# Patient Record
Sex: FEMALE | Race: WHITE | Employment: UNEMPLOYED | URBAN - METROPOLITAN AREA
[De-identification: names, ages, dates, MRNs, and addresses within clinical notes are randomized per-mention and may not be internally consistent; named-entity substitution may affect disease eponyms.]

---

## 2018-07-25 ENCOUNTER — OFFICE VISIT (OUTPATIENT)
Dept: FAMILY MEDICINE CLINIC | Age: 73
End: 2018-07-25

## 2018-07-25 ENCOUNTER — TELEPHONE (OUTPATIENT)
Dept: FAMILY MEDICINE CLINIC | Age: 73
End: 2018-07-25

## 2018-07-25 VITALS
HEART RATE: 73 BPM | OXYGEN SATURATION: 96 % | TEMPERATURE: 96 F | RESPIRATION RATE: 16 BRPM | BODY MASS INDEX: 29.99 KG/M2 | HEIGHT: 57 IN | DIASTOLIC BLOOD PRESSURE: 64 MMHG | WEIGHT: 139 LBS | SYSTOLIC BLOOD PRESSURE: 100 MMHG

## 2018-07-25 DIAGNOSIS — M65.331 TRIGGER FINGER, RIGHT MIDDLE FINGER: Primary | ICD-10-CM

## 2018-07-25 DIAGNOSIS — I48.20 CHRONIC ATRIAL FIBRILLATION (HCC): ICD-10-CM

## 2018-07-25 DIAGNOSIS — E11.9 CONTROLLED TYPE 2 DIABETES MELLITUS WITHOUT COMPLICATION, WITHOUT LONG-TERM CURRENT USE OF INSULIN (HCC): Primary | ICD-10-CM

## 2018-07-25 RX ORDER — ATENOLOL 100 MG/1
50 TABLET ORAL DAILY
COMMUNITY
Start: 2018-05-09

## 2018-07-25 RX ORDER — METFORMIN HYDROCHLORIDE 1000 MG/1
TABLET ORAL
COMMUNITY
Start: 2018-05-04

## 2018-07-25 RX ORDER — OLIVE LEAF EXTRACT 250 MG
CAPSULE ORAL
COMMUNITY
End: 2019-12-04

## 2018-07-25 NOTE — MR AVS SNAPSHOT
[address] [place] [address] [address] [id number] 
[phone] Patient: Nicole Fields MRN: [id number] UDX:[id number] Visit Information Date & Time Provider Department Dept. Phone Encounter #  
 7/25/2018 11:30 AM Sarahi Dos Santos MD [facility] [place] [phone] [id number] Follow-up Instructions Return in about 4 months (around 11/25/2018). Follow-up and Disposition History Upcoming Health Maintenance Date Due Hepatitis C Screening 1945 DTaP/Tdap/Td series (1 - Tdap) 2/14/1967 FOBT Q 1 YEAR AGE 50-75 2/14/1996 ZOSTER VACCINE AGE 60> 12/14/2005 GLAUCOMA SCREENING Q2Y 2/14/2011 Pneumococcal 65+ Low/Medium Risk (1 of 2 - PCV13) 2/14/2011 BREAST CANCER SCRN MAMMOGRAM 1/28/2017 MEDICARE YEARLY EXAM 3/20/2018 Influenza Age 5 to Adult 8/1/2018 Allergies as of 7/25/2018  Review Complete On: 7/25/2018 By: Sarahi Dos Santos MD  
  
 Severity Noted Reaction Type Reactions Alendronate High   Swelling In the feet Current Immunizations  Never Reviewed No immunizations on file. Not reviewed this visit You Were Diagnosed With   
  
 Codes Comments Controlled type 2 diabetes mellitus without complication, without long-term current use of insulin ([facility]    -  Primary ICD-10-CM: E11.9 ICD-9-CM: 250.00 Chronic atrial fibrillation (HCC)     ICD-10-CM: F25.2 ICD-9-CM: 427.31 Vitals BP Pulse Temp Resp Height(growth percentile) Weight(growth percentile) 100/64 (BP 1 Location: Left arm, BP Patient Position: Sitting) 73 96 °F (35.6 °C) (Oral) 16 4' 9\" (1.448 m) 139 lb (63 kg) SpO2 BMI OB Status Smoking Status 96% 30.08 kg/m2 Menopause Never Smoker Vitals History BMI and BSA Data Body Mass Index Body Surface Area 30.08 kg/m 2 1.59 m 2 Preferred Pharmacy Pharmacy Name Phone [address] [place] [address] [phone] Your Updated Medication List  
  
   
This list is accurate as of 7/25/18 12:14 PM.  Always use your most recent med list.  
  
  
  
  
 apixaban 5 mg tablet Commonly known as:  ELIQUIS  
5 mg. ASCORBIC ACID (VITAMIN C) PO Take  by Mouth. atenolol 100 mg tablet Commonly known as:  TENORMIN  
100 mg. CHOLECALCIFEROL (VITAMIN D3) PO  
1,000 mg. CINNAMON BARK PO Take  by Mouth. CYANOCOBALAMIN (VITAMIN B-12) PO Take  by Mouth. GYMNEMA LEAF (BULK)  
by Does Not Apply route. metFORMIN 1,000 mg tablet Commonly known as:  GLUCOPHAGE  
TAKE 1 TABLET TWICE DAILY  
  
 olive leaf extract 250 mg Cap Take  by mouth. vit a,c & e-lutein-minerals tablet Commonly known as:  OCUVITE  
daily. Follow-up Instructions Return in about 4 months (around 11/25/2018). Introducing [facility]! Florecita Conley introduces [employer] patient portal. Now you can access parts of your medical record, email your doctor's office, and request medication refills online. 1. In your internet browser, go to [email or web address] "[email or web address] 2. Click on the First Time User? Click Here link in the Sign In box. You will see the New Member Sign Up page. 3. Enter your [employer] Access Code exactly as it appears below. You will not need to use this code after youve completed the sign-up process. If you do not sign up before the expiration date, you must request a new code. · [employer] Access Code: [email or web address] Expires: 10/23/2018 12:14 PM 
 
4. Enter the last four digits of your Social Security Number (xxxx) and Date of Birth (mm/dd/yyyy) as indicated and click Submit. You will be taken to the next sign-up page. 5. Create a ZhenXin ID. This will be your [employer] login ID and cannot be changed, so think of one that is secure and easy to remember. 6. Create a [employer] password. You can change your password at any time. 7. Enter your Password Reset Question and Answer. This can be used at a later time if you forget your password. 8. Enter your e-mail address. You will receive e-mail notification when new information is available in [address] 9. Click Sign Up. You can now view and download portions of your medical record. 10. Click the Download Summary menu link to download a portable copy of your medical information. If you have questions, please visit the Frequently Asked Questions section of the [employer] website. Remember, [employer] is NOT to be used for urgent needs. For medical emergencies, dial 911. Now available from your iPhone and Android! Please provide this summary of care documentation to your next provider. Your primary care clinician is listed as Wilson Jameson. If you have any questions after today's visit, please call [phone].

## 2018-07-25 NOTE — PROGRESS NOTES
HISTORY OF PRESENT ILLNESS  Nury Givens is a 68 y.o. female here to establish care. Patient has history of diabetes who maintains her A1c at 6.1. She also has a history of atrial fibrillation for which she uses Eliquis. Overall patient is feeling well and has no complaints. Diabetes   The history is provided by the patient. This is a chronic problem. The problem has not changed since onset. Pertinent negatives include no chest pain, no abdominal pain, no headaches and no shortness of breath. The symptoms are aggravated by eating. The symptoms are relieved by medications. Treatments tried: Metformin. The treatment provided significant relief. Irregular Heart Beat    The history is provided by the patient. This is a chronic problem. The problem has not changed since onset. The problem is associated with nothing. Associated symptoms include irregular heartbeat. Pertinent negatives include no diaphoresis, no malaise/fatigue, no chest pain, no chest pressure, no exertional chest pressure, no orthopnea, no abdominal pain, no headaches and no shortness of breath. Risk factors include diabetes mellitus and postmenopause. She has tried prescription medication (Eliquis and atenolol) for the symptoms. The treatment provided significant relief. Her past medical history is significant for atrial fibrillation. Allergies   Allergen Reactions    Alendronate Swelling     In the feet     No current outpatient prescriptions on file prior to visit. No current facility-administered medications on file prior to visit.       Past Medical History:   Diagnosis Date    [facility] ([facility]     Diabetes ([facility]     Melanoma ([facility]     back of the head, removed    Osteoporosis     Periodic heart flutter      Past Surgical History:   Procedure Laterality Date    HX APPENDECTOMY  1966    HX OTHER SURGICAL  1963    Coccygectomy    HX TONSILLECTOMY  1965    HX TONSILLECTOMY      HX TUBAL LIGATION  1975     Family History   Problem Relation Age of Onset    Cancer Mother     Cancer Father      Social History     Social History    Marital status: UNKNOWN     Spouse name: N/A    Number of children: N/A    Years of education: N/A     Occupational History    Not on file. Social History Main Topics    Smoking status: Never Smoker    Smokeless tobacco: Never Used    Alcohol use No    Drug use: No    Sexual activity: Not Currently     Other Topics Concern    Not on file     Social History Narrative       Review of Systems   Constitutional: Negative. Negative for diaphoresis and malaise/fatigue. Eyes: Negative. Respiratory: Negative. Negative for shortness of breath. Cardiovascular: Positive for palpitations. Negative for chest pain and orthopnea. Gastrointestinal: Negative for abdominal pain. Musculoskeletal: Negative. Neurological: Negative. Negative for headaches. Endo/Heme/Allergies: Negative. Psychiatric/Behavioral: Negative. .  Visit Vitals    /64 (BP 1 Location: Left arm, BP Patient Position: Sitting)    Pulse 73    Temp 96 °F (35.6 °C) (Oral)    Resp 16    Ht 4' 9\" (1.448 m)    Wt 139 lb (63 kg)    SpO2 96%    BMI 30.08 kg/m2       Physical Exam   Constitutional: She is oriented to person, place, and time. She appears well-developed and well-nourished. HENT:   Head: Normocephalic and atraumatic. Cardiovascular: Normal rate, regular rhythm, normal heart sounds and intact distal pulses. Exam reveals no gallop and no friction rub. No murmur heard. Pulmonary/Chest: Effort normal and breath sounds normal. No respiratory distress. She has no wheezes. She has no rales. Musculoskeletal: Normal range of motion. She exhibits no edema, tenderness or deformity. Neurological: She is alert and oriented to person, place, and time. No cranial nerve deficit. Coordination normal.   Skin: Skin is warm and dry. No rash noted. No erythema. No pallor. Psychiatric: She has a normal mood and affect.  Her behavior is normal. Judgment and thought content normal.   Nursing note and vitals reviewed. ASSESSMENT and PLAN    ICD-10-CM ICD-9-CM    1. Controlled type 2 diabetes mellitus without complication, without long-term current use of insulin (HCC) E11.9 250.00 HEMOGLOBIN A1C WITH EAG      CBC WITH AUTOMATED DIFF      METABOLIC PANEL, COMPREHENSIVE      LIPID PANEL      MICROALBUMIN, UR, 24 HR   2. Chronic atrial fibrillation (HCC) I48.2 427.31      Follow-up Disposition:  Return in about 4 months (around 11/25/2018).

## 2018-07-26 LAB
ALBUMIN 24H UR-MRATE: NORMAL MG/DAY
ALBUMIN SERPL-MCNC: 4.6 G/DL (ref 3.5–4.8)
ALBUMIN/GLOB SERPL: 2.1 {RATIO} (ref 1.2–2.2)
ALP SERPL-CCNC: 76 IU/L (ref 39–117)
ALT SERPL-CCNC: 15 IU/L (ref 0–32)
AST SERPL-CCNC: 18 IU/L (ref 0–40)
BASOPHILS # BLD AUTO: 0 X10E3/UL (ref 0–0.2)
BASOPHILS NFR BLD AUTO: 0 %
BILIRUB SERPL-MCNC: 0.7 MG/DL (ref 0–1.2)
BUN SERPL-MCNC: 12 MG/DL (ref 8–27)
BUN/CREAT SERPL: 21 (ref 12–28)
CALCIUM SERPL-MCNC: 9.9 MG/DL (ref 8.7–10.3)
CHLORIDE SERPL-SCNC: 100 MMOL/L (ref 96–106)
CHOLEST SERPL-MCNC: 131 MG/DL (ref 100–199)
CO2 SERPL-SCNC: 23 MMOL/L (ref 20–29)
CREAT SERPL-MCNC: 0.58 MG/DL (ref 0.57–1)
EOSINOPHIL # BLD AUTO: 0.1 X10E3/UL (ref 0–0.4)
EOSINOPHIL NFR BLD AUTO: 2 %
ERYTHROCYTE [DISTWIDTH] IN BLOOD BY AUTOMATED COUNT: 14.7 % (ref 12.3–15.4)
EST. AVERAGE GLUCOSE BLD GHB EST-MCNC: 123 MG/DL
GLOBULIN SER CALC-MCNC: 2.2 G/DL (ref 1.5–4.5)
GLUCOSE SERPL-MCNC: 121 MG/DL (ref 65–99)
HBA1C MFR BLD: 5.9 % (ref 4.8–5.6)
HCT VFR BLD AUTO: 39.3 % (ref 34–46.6)
HDLC SERPL-MCNC: 75 MG/DL
HGB BLD-MCNC: 12.6 G/DL (ref 11.1–15.9)
IMM GRANULOCYTES # BLD: 0 X10E3/UL (ref 0–0.1)
IMM GRANULOCYTES NFR BLD: 0 %
INTERPRETATION, 910389: NORMAL
LDLC SERPL CALC-MCNC: 37 MG/DL (ref 0–99)
LYMPHOCYTES # BLD AUTO: 2.4 X10E3/UL (ref 0.7–3.1)
LYMPHOCYTES NFR BLD AUTO: 31 %
MCH RBC QN AUTO: 27.8 PG (ref 26.6–33)
MCHC RBC AUTO-ENTMCNC: 32.1 G/DL (ref 31.5–35.7)
MCV RBC AUTO: 87 FL (ref 79–97)
MICROALBUMIN UR-MCNC: <3 UG/ML
MONOCYTES # BLD AUTO: 0.6 X10E3/UL (ref 0.1–0.9)
MONOCYTES NFR BLD AUTO: 8 %
NEUTROPHILS # BLD AUTO: 4.6 X10E3/UL (ref 1.4–7)
NEUTROPHILS NFR BLD AUTO: 59 %
PLATELET # BLD AUTO: 403 X10E3/UL (ref 150–379)
POTASSIUM SERPL-SCNC: 4.6 MMOL/L (ref 3.5–5.2)
PROT SERPL-MCNC: 6.8 G/DL (ref 6–8.5)
RBC # BLD AUTO: 4.53 X10E6/UL (ref 3.77–5.28)
SODIUM SERPL-SCNC: 138 MMOL/L (ref 134–144)
TRIGL SERPL-MCNC: 94 MG/DL (ref 0–149)
VLDLC SERPL CALC-MCNC: 19 MG/DL (ref 5–40)
WBC # BLD AUTO: 7.8 X10E3/UL (ref 3.4–10.8)

## 2018-07-26 NOTE — TELEPHONE ENCOUNTER
Spoke with patient regarding her referral. Patient aware and provided with number to Dr. Laura Reyes.

## 2018-08-10 ENCOUNTER — TELEPHONE (OUTPATIENT)
Dept: FAMILY MEDICINE CLINIC | Age: 73
End: 2018-08-10

## 2018-11-26 ENCOUNTER — OFFICE VISIT (OUTPATIENT)
Dept: FAMILY MEDICINE CLINIC | Age: 73
End: 2018-11-26

## 2018-11-26 VITALS
HEART RATE: 75 BPM | OXYGEN SATURATION: 99 % | WEIGHT: 136.2 LBS | DIASTOLIC BLOOD PRESSURE: 58 MMHG | HEIGHT: 57 IN | SYSTOLIC BLOOD PRESSURE: 141 MMHG | BODY MASS INDEX: 29.38 KG/M2 | RESPIRATION RATE: 16 BRPM | TEMPERATURE: 97.6 F

## 2018-11-26 DIAGNOSIS — E11.9 CONTROLLED TYPE 2 DIABETES MELLITUS WITHOUT COMPLICATION, WITHOUT LONG-TERM CURRENT USE OF INSULIN (HCC): ICD-10-CM

## 2018-11-26 DIAGNOSIS — I48.20 CHRONIC ATRIAL FIBRILLATION (HCC): Primary | ICD-10-CM

## 2018-11-26 NOTE — PROGRESS NOTES
Ledy Mcdowell is a 68 y.o. female (: 1945) presenting to address: Chief Complaint Patient presents with  Diabetes  
  follow up  Irregular Heart Beat  
  follow up Vitals:  
 18 1105 BP: 141/58 Pulse: 75 Resp: 16 Temp: 97.6 °F (36.4 °C) TempSrc: Oral  
SpO2: 99% Weight: 136 lb 3.2 oz (61.8 kg) Height: 4' 9\" (1.448 m) PainSc:   0 - No pain Hearing/Vision: No exam data present Learning Assessment:  
 
Learning Assessment 2018 PRIMARY LEARNER Patient PRIMARY LANGUAGE ENGLISH  
LEARNER PREFERENCE PRIMARY DEMONSTRATION  
  READING  
  LISTENING  
ANSWERED BY Patient RELATIONSHIP SELF Depression Screening: PHQ over the last two weeks 2018 Little interest or pleasure in doing things Not at all Feeling down, depressed, irritable, or hopeless Not at all Total Score PHQ 2 0 Fall Risk Assessment:  
 
Fall Risk Assessment, last 12 mths 2018 Able to walk? Yes Fall in past 12 months? No  
 
Abuse Screening:  
 
Abuse Screening Questionnaire 2018 Do you ever feel afraid of your partner? -  
Are you in a relationship with someone who physically or mentally threatens you? -  
Is it safe for you to go home? Christina Rainey Coordination of Care Questionaire: 1. Have you been to the ER, urgent care clinic since your last visit? Hospitalized since your last visit? NO 
 
2. Have you seen or consulted any other health care providers outside of the [address] [place] [address] since your last visit? Include any pap smears or colon screening.  NO

## 2018-11-26 NOTE — PATIENT INSTRUCTIONS
Vaccine Information Statement Pneumococcal Polysaccharide Vaccine: What You Need to Know Many Vaccine Information Statements are available in Setswana and other languages. See www.immunize.org/vis. Hojas de información Sobre Vacunas están disponibles en español y en muchos otros idiomas. Visite Ebony.si. 1. Why get vaccinated? Vaccination can protect older adults (and some children and younger adults) from pneumococcal disease. Pneumococcal disease is caused by bacteria that can spread from person to person through close contact. It can cause ear infections, and it can also lead to more serious infections of the: 
 Lungs (pneumonia),  Blood (bacteremia), and 
 Covering of the brain and spinal cord (meningitis). Meningitis can cause deafness and brain damage, and it can be fatal.   
 
Anyone can get pneumococcal disease, but children under 3years of age, people with certain medical conditions, adults over 72years of age, and cigarette smokers are at the highest risk. About 18,000 older adults die each year from pneumococcal disease in the [place]. Treatment of pneumococcal infections with penicillin and other drugs used to be more effective. But some strains of the disease have become resistant to these drugs. This makes prevention of the disease, through vaccination, even more important. 2. Pneumococcal polysaccharide vaccine (PPSV23) Pneumococcal polysaccharide vaccine (PPSV23) protects against 23 types of pneumococcal bacteria. It will not prevent all pneumococcal disease. PPSV23 is recommended for:  All adults 72years of age and older,  Anyone 2 through 59years of age with certain long-term health problems, 
 Anyone 2 through 59years of age with a weakened immune system, 
 Adults 23 through 59years of age who smoke cigarettes or have asthma. Most people need only one dose of PPSV.   A second dose is recommended for certain high-risk groups. People 72 and older should get a dose even if they have gotten one or more doses of the vaccine before they turned 65. Your healthcare provider can give you more information about these recommendations. Most healthy adults develop protection within 2 to 3 weeks of getting the shot. 3. Some people should not get this vaccine  Anyone who has had a life-threatening allergic reaction to PPSV should not get another dose.  Anyone who has a severe allergy to any component of PPSV should not receive it. Tell your provider if you have any severe allergies.  Anyone who is moderately or severely ill when the shot is scheduled may be asked to wait until they recover before getting the vaccine. Someone with a mild illness can usually be vaccinated.  Children less than 3years of age should not receive this vaccine.  There is no evidence that PPSV is harmful to either a pregnant woman or to her fetus. However, as a precaution, women who need the vaccine should be vaccinated before becoming pregnant, if possible. 4. Risks of a vaccine reaction With any medicine, including vaccines, there is a chance of side effects. These are usually mild and go away on their own, but serious reactions are also possible. About half of people who get PPSV have mild side effects, such as redness or pain where the shot is given, which go away within about two days. Less than 1 out of 100 people develop a fever, muscle aches, or more severe local reactions. Problems that could happen after any vaccine:  People sometimes faint after a medical procedure, including vaccination. Sitting or lying down for about 15 minutes can help prevent fainting, and injuries caused by a fall. Tell your doctor if you feel dizzy, or have vision changes or ringing in the ears.  
 
 Some people get severe pain in the shoulder and have difficulty moving the arm where a shot was given. This happens very rarely.  Any medication can cause a severe allergic reaction. Such reactions from a vaccine are very rare, estimated at about 1 in a million doses, and would happen within a few minutes to a few hours after the vaccination. As with any medicine, there is a very remote chance of a vaccine causing a serious injury or death. The safety of vaccines is always being monitored. For more information, visit: www.cdc.gov/vaccinesafety/  
 
5. What if there is a serious reaction? What should I look for? Look for anything that concerns you, such as signs of a severe allergic reaction, very high fever, or unusual behavior. Signs of a severe allergic reaction can include hives, swelling of the face and throat, difficulty breathing, a fast heartbeat, dizziness, and weakness. These would usually start a few minutes to a few hours after the vaccination. What should I do? If you think it is a severe allergic reaction or other emergency that cant wait, call 9-1-1 or get to the nearest hospital. Otherwise, call your doctor. Afterward, the reaction should be reported to the Vaccine Adverse Event Reporting System (VAERS). Your doctor might file this report, or you can do it yourself through the VAERS web site at www.vaers. hhs.gov, or by calling 7-218.320.7890. [employer] does not give medical advice. 6. How can I learn more?  Ask your doctor. He or she can give you the vaccine package insert or suggest other sources of information.  Call your local or state health department.  Contact the Centers for Disease Control and Prevention (CDC): 
- Call 7-177.913.1423 (1-800-CDC-INFO) or 
- Visit CDCs website at www.cdc.gov/vaccines Vaccine Information Statement PPSV  
04/24/2015 Department of [facility] and [employer] Centers for Disease Control and Prevention Office Use Only

## 2018-11-26 NOTE — PROGRESS NOTES
HISTORY OF PRESENT ILLNESS Damian Crain is a 68 y.o. female. We will up on diabetes and chronic atrial fibrillation. Patient's main complaint today is that she sees a light when she awakens in the morning and her left eye. Diabetes The history is provided by the patient. This is a chronic problem. The problem has not changed since onset. Pertinent negatives include no chest pain, no abdominal pain, no headaches and no shortness of breath. The symptoms are aggravated by eating. The symptoms are relieved by medications. Treatments tried: Metformin. The treatment provided significant relief. Irregular Heart Beat The history is provided by the patient. This is a chronic problem. The problem has not changed since onset. The problem is associated with nothing. Associated symptoms include irregular heartbeat. Pertinent negatives include no diaphoresis, no malaise/fatigue, no chest pain, no chest pressure, no exertional chest pressure, no orthopnea, no abdominal pain, no headaches and no shortness of breath. Risk factors include diabetes mellitus and postmenopause. She has tried prescription medication (Eliquis and atenolol) for the symptoms. The treatment provided significant relief. Her past medical history is significant for atrial fibrillation. Allergies Allergen Reactions  Alendronate Swelling In the feet Current Outpatient Medications on File Prior to Visit Medication Sig Dispense Refill  apixaban (ELIQUIS) 5 mg tablet 5 mg.  ASCORBIC ACID, VITAMIN C, PO Take  by Mouth.  CHOLECALCIFEROL, VITAMIN D3, PO 1,000 mg.    
 CYANOCOBALAMIN, VITAMIN B-12, PO Take  by Mouth.  metFORMIN (GLUCOPHAGE) 1,000 mg tablet TAKE 1 TABLET TWICE DAILY  atenolol (TENORMIN) 100 mg tablet 100 mg.    
 vit a,c & e-lutein-minerals (OCUVITE) tablet daily.  GYMNEMA LEAF, BULK, by Does Not Apply route.  olive leaf extract 250 mg cap Take  by mouth. No current facility-administered medications on file prior to visit. Past Medical History:  
Diagnosis Date  A-fib ([facility]  Diabetes ([facility]  Melanoma ([facility] back of the head, removed  Osteoporosis  Periodic heart flutter Past Surgical History:  
Procedure Laterality Date [address] [facility] [address] [facility]  HX TONSILLECTOMY [facility] Family History Problem Relation Age of Onset  Cancer Mother  Cancer Father Social History Socioeconomic History  Marital status: UNKNOWN Spouse name: Not on file  Number of children: Not on file  Years of education: Not on file  Highest education level: Not on file Social Needs  Financial resource strain: Not on file  Food insecurity - worry: Not on file  Food insecurity - inability: Not on file  Transportation needs - medical: Not on file  Transportation needs - non-medical: Not on file Occupational History  Not on file Tobacco Use  Smoking status: Never Smoker  Smokeless tobacco: Never Used Substance and Sexual Activity  Alcohol use: No  
 Drug use: No  
 Sexual activity: Not Currently Other Topics Concern  Not on file Social History Narrative  Not on file Review of Systems Constitutional: Negative. Negative for diaphoresis and malaise/fatigue. Eyes: Negative. Respiratory: Negative. Negative for shortness of breath. Cardiovascular: Positive for palpitations. Negative for chest pain and orthopnea. Gastrointestinal: Negative for abdominal pain. Musculoskeletal: Negative. Neurological: Negative. Negative for headaches. Endo/Heme/Allergies: Negative. Psychiatric/Behavioral: Negative. Visit Vitals /58 (BP 1 Location: Right arm, BP Patient Position: Sitting) Pulse 75 Temp 97.6 °F (36.4 °C) (Oral) Resp 16 Ht 4' 9\" (1.448 m) Wt 136 lb 3.2 oz (61.8 kg) SpO2 99% BMI 29.47 kg/m² Physical Exam  
Constitutional: She is oriented to person, place, and time. She appears well-developed and well-nourished. HENT:  
Head: Normocephalic and atraumatic. Cardiovascular: Normal rate, regular rhythm, normal heart sounds and intact distal pulses. Exam reveals no gallop and no friction rub. No murmur heard. Pulmonary/Chest: Effort normal and breath sounds normal. No respiratory distress. She has no wheezes. She has no rales. Musculoskeletal: Normal range of motion. She exhibits no edema, tenderness or deformity. Neurological: She is alert and oriented to person, place, and time. No cranial nerve deficit. Coordination normal.  
Skin: Skin is warm and dry. No rash noted. No erythema. No pallor. Psychiatric: She has a normal mood and affect. Her behavior is normal. Judgment and thought content normal.  
Nursing note and vitals reviewed. ASSESSMENT and PLAN 
  ICD-10-CM ICD-9-CM 1. Chronic atrial fibrillation ([facility]) I48.2 427.31   
2. Controlled type 2 diabetes mellitus without complication, without long-term current use of insulin ([facility]) E11.9 250.00 HEMOGLOBIN A1C WITH EAG  
   CBC WITH AUTOMATED DIFF  
   LIPID PANEL MICROALBUMIN, UR, RAND W/ MICROALB/CREAT RATIO  
   URINALYSIS W/ RFLX MICROSCOPIC Follow-up Disposition: 
Return in about 6 months (around 5/26/2019).

## 2018-11-28 LAB
ALBUMIN/CREAT UR: 10.8 MG/G CREAT (ref 0–30)
APPEARANCE UR: CLEAR
BASOPHILS # BLD AUTO: 0 X10E3/UL (ref 0–0.2)
BASOPHILS NFR BLD AUTO: 0 %
BILIRUB UR QL STRIP: NEGATIVE
CHOLEST SERPL-MCNC: 128 MG/DL (ref 100–199)
COLOR UR: YELLOW
CREAT UR-MCNC: 155.9 MG/DL
EOSINOPHIL # BLD AUTO: 0.1 X10E3/UL (ref 0–0.4)
EOSINOPHIL NFR BLD AUTO: 1 %
ERYTHROCYTE [DISTWIDTH] IN BLOOD BY AUTOMATED COUNT: 14.2 % (ref 12.3–15.4)
EST. AVERAGE GLUCOSE BLD GHB EST-MCNC: 123 MG/DL
GLUCOSE UR QL: NEGATIVE
HBA1C MFR BLD: 5.9 % (ref 4.8–5.6)
HCT VFR BLD AUTO: 37.9 % (ref 34–46.6)
HDLC SERPL-MCNC: 72 MG/DL
HGB BLD-MCNC: 12.5 G/DL (ref 11.1–15.9)
HGB UR QL STRIP: NEGATIVE
IMM GRANULOCYTES # BLD: 0 X10E3/UL (ref 0–0.1)
IMM GRANULOCYTES NFR BLD: 0 %
INTERPRETATION, 910389: NORMAL
KETONES UR QL STRIP: NEGATIVE
LDLC SERPL CALC-MCNC: 40 MG/DL (ref 0–99)
LEUKOCYTE ESTERASE UR QL STRIP: NEGATIVE
LYMPHOCYTES # BLD AUTO: 3.1 X10E3/UL (ref 0.7–3.1)
LYMPHOCYTES NFR BLD AUTO: 36 %
Lab: NORMAL
MCH RBC QN AUTO: 28.9 PG (ref 26.6–33)
MCHC RBC AUTO-ENTMCNC: 33 G/DL (ref 31.5–35.7)
MCV RBC AUTO: 88 FL (ref 79–97)
MICRO URNS: NORMAL
MICROALBUMIN UR-MCNC: 16.8 UG/ML
MONOCYTES # BLD AUTO: 0.5 X10E3/UL (ref 0.1–0.9)
MONOCYTES NFR BLD AUTO: 6 %
NEUTROPHILS # BLD AUTO: 4.9 X10E3/UL (ref 1.4–7)
NEUTROPHILS NFR BLD AUTO: 57 %
NITRITE UR QL STRIP: NEGATIVE
PH UR STRIP: 6.5 [PH] (ref 5–7.5)
PLATELET # BLD AUTO: 407 X10E3/UL (ref 150–379)
PROT UR QL STRIP: NEGATIVE
RBC # BLD AUTO: 4.33 X10E6/UL (ref 3.77–5.28)
SP GR UR: 1.02 (ref 1–1.03)
TRIGL SERPL-MCNC: 79 MG/DL (ref 0–149)
UROBILINOGEN UR STRIP-MCNC: 0.2 MG/DL (ref 0.2–1)
VLDLC SERPL CALC-MCNC: 16 MG/DL (ref 5–40)
WBC # BLD AUTO: 8.6 X10E3/UL (ref 3.4–10.8)

## 2018-12-07 ENCOUNTER — TELEPHONE (OUTPATIENT)
Dept: FAMILY MEDICINE CLINIC | Age: 73
End: 2018-12-07

## 2018-12-13 NOTE — TELEPHONE ENCOUNTER
Called and spoke with patient regarding lab results. Per Dr. Delia Lee, patient's labs essentially within normal limits with an A1C of 5.9. Patient verbalized understanding and had no further questions at this time.

## 2019-05-13 ENCOUNTER — OFFICE VISIT (OUTPATIENT)
Dept: FAMILY MEDICINE CLINIC | Age: 74
End: 2019-05-13

## 2019-05-13 VITALS
WEIGHT: 135.6 LBS | BODY MASS INDEX: 29.26 KG/M2 | HEART RATE: 71 BPM | TEMPERATURE: 97.1 F | OXYGEN SATURATION: 97 % | HEIGHT: 57 IN | DIASTOLIC BLOOD PRESSURE: 60 MMHG | SYSTOLIC BLOOD PRESSURE: 126 MMHG | RESPIRATION RATE: 16 BRPM

## 2019-05-13 DIAGNOSIS — J06.9 VIRAL UPPER RESPIRATORY TRACT INFECTION: Primary | ICD-10-CM

## 2019-05-13 RX ORDER — BENZONATATE 100 MG/1
100 CAPSULE ORAL
Qty: 21 CAP | Refills: 0 | Status: SHIPPED | OUTPATIENT
Start: 2019-05-13 | End: 2019-05-14 | Stop reason: SDUPTHER

## 2019-05-13 NOTE — PROGRESS NOTES
Rhys Mederos is a 69 y.o. female (: 1945) presenting to address:    Chief Complaint   Patient presents with    Cold Symptoms     x 3 days       Vitals:    19 1644   BP: 126/60   Pulse: 71   Resp: 16   Temp: 97.1 °F (36.2 °C)   TempSrc: Temporal   SpO2: 97%   Weight: 135 lb 9.6 oz (61.5 kg)   Height: 4' 9\" (1.448 m)   PainSc:   0 - No pain       Hearing/Vision:   No exam data present    Learning Assessment:     Learning Assessment 2018   PRIMARY LEARNER Patient   PRIMARY LANGUAGE ENGLISH   LEARNER PREFERENCE PRIMARY DEMONSTRATION     READING     LISTENING   ANSWERED BY Patient    RELATIONSHIP SELF     Depression Screening:     3 most recent PHQ Screens 2018   Little interest or pleasure in doing things Not at all   Feeling down, depressed, irritable, or hopeless Not at all   Total Score PHQ 2 0     Fall Risk Assessment:     Fall Risk Assessment, last 12 mths 2018   Able to walk? Yes   Fall in past 12 months? No     Abuse Screening:     Abuse Screening Questionnaire 2018   Do you ever feel afraid of your partner? -   Are you in a relationship with someone who physically or mentally threatens you? -   Is it safe for you to go home? Y     Coordination of Care Questionaire:   1. Have you been to the ER, urgent care clinic since your last visit? Hospitalized since your last visit? NO    2. Have you seen or consulted any other health care providers outside of the [address] [place] [address] since your last visit? Include any pap smears or colon screening.  NO

## 2019-05-13 NOTE — PROGRESS NOTES
HISTORY OF PRESENT ILLNESS  Angela Castañeda is a 69 y.o. female here for evaluation of runny nose and cough. Patient is complaining of 2 days of runny nose and cough. She has tried Zyrtec and Xyzal without relief along with Flonase. .  Cold Symptoms   The history is provided by the patient. This is a new problem. The problem has not changed since onset. The cough is non-productive. There has been no fever. Associated symptoms include sore throat. Pertinent negatives include no chest pain, no chills, no sweats, no myalgias, no shortness of breath, no wheezing and no nausea. She has tried decongestants and cough syrup for the symptoms. She is not a smoker. Her past medical history does not include pneumonia, COPD, emphysema, asthma or CHF. Allergies   Allergen Reactions    Alendronate Swelling     In the feet     Current Outpatient Medications on File Prior to Visit   Medication Sig Dispense Refill    apixaban (ELIQUIS) 5 mg tablet 5 mg.  ASCORBIC ACID, VITAMIN C, PO Take  by Mouth.  CHOLECALCIFEROL, VITAMIN D3, PO 1,000 mg.      CYANOCOBALAMIN, VITAMIN B-12, PO Take  by Mouth.  metFORMIN (GLUCOPHAGE) 1,000 mg tablet TAKE 1 TABLET TWICE DAILY      atenolol (TENORMIN) 100 mg tablet 100 mg.      vit a,c & e-lutein-minerals (OCUVITE) tablet daily.  GYMNEMA LEAF, BULK, by Does Not Apply route.  olive leaf extract 250 mg cap Take  by mouth. No current facility-administered medications on file prior to visit.       Past Medical History:   Diagnosis Date    [facility]     Diabetes ([facility]     Melanoma ([facility]     back of the head, removed    Osteoporosis     Periodic heart flutter      Past Surgical History:   Procedure Laterality Date    HX APPENDECTOMY  1966    HX OTHER SURGICAL  1963    Coccygectomy    HX TONSILLECTOMY  1965    HX TONSILLECTOMY      HX TUBAL LIGATION  1975     Family History   Problem Relation Age of Onset    Cancer Mother     Cancer Father      Social History Socioeconomic History    Marital status: UNKNOWN     Spouse name: Not on file    Number of children: Not on file    Years of education: Not on file    Highest education level: Not on file   Occupational History    Not on file   Social Needs    Financial resource strain: Not on file    Food insecurity:     Worry: Not on file     Inability: Not on file    Transportation needs:     Medical: Not on file     Non-medical: Not on file   Tobacco Use    Smoking status: Never Smoker    Smokeless tobacco: Never Used   Substance and Sexual Activity    Alcohol use: No    Drug use: No    Sexual activity: Not Currently   Lifestyle    Physical activity:     Days per week: Not on file     Minutes per session: Not on file    Stress: Not on file   Relationships    Social connections:     Talks on phone: Not on file     Gets together: Not on file     Attends Mormonism service: Not on file     Active member of club or organization: Not on file     Attends meetings of clubs or organizations: Not on file     Relationship status: Not on file    Intimate partner violence:     Fear of current or ex partner: Not on file     Emotionally abused: Not on file     Physically abused: Not on file     Forced sexual activity: Not on file   Other Topics Concern    Not on file   Social History Narrative    Not on file       Review of Systems   Constitutional: Negative. Negative for chills. HENT: Positive for sore throat. Eyes: Negative. Respiratory: Negative. Negative for shortness of breath and wheezing. Cardiovascular: Negative. Negative for chest pain. Gastrointestinal: Negative for nausea. Musculoskeletal: Negative. Negative for myalgias. Neurological: Negative. Endo/Heme/Allergies: Negative. Psychiatric/Behavioral: Negative.       Visit Vitals  /60 (BP 1 Location: Right arm, BP Patient Position: Sitting)   Pulse 71   Temp 97.1 °F (36.2 °C) (Temporal)   Resp 16   Ht 4' 9\" (1.448 m)   Wt 135 lb 9.6 oz (61.5 kg)   SpO2 97%   BMI 29.34 kg/m²       Physical Exam   Constitutional: She is oriented to person, place, and time. She appears well-developed and well-nourished. HENT:   Head: Normocephalic and atraumatic. Cardiovascular: Normal rate, regular rhythm, normal heart sounds and intact distal pulses. Exam reveals no gallop and no friction rub. No murmur heard. Pulmonary/Chest: Effort normal and breath sounds normal. No respiratory distress. She has no wheezes. She has no rales. Musculoskeletal: Normal range of motion. She exhibits no edema, tenderness or deformity. Neurological: She is alert and oriented to person, place, and time. No cranial nerve deficit. Coordination normal.   Skin: Skin is warm and dry. No rash noted. No erythema. No pallor. Psychiatric: She has a normal mood and affect. Her behavior is normal. Judgment and thought content normal.   Nursing note and vitals reviewed. ASSESSMENT and PLAN    ICD-10-CM ICD-9-CM    1. Viral upper respiratory tract infection J06.9 465.9      Follow-up and Dispositions    · Return if symptoms worsen or fail to improve.

## 2019-05-14 ENCOUNTER — DOCUMENTATION ONLY (OUTPATIENT)
Dept: FAMILY MEDICINE CLINIC | Age: 74
End: 2019-05-14

## 2019-05-14 RX ORDER — BENZONATATE 100 MG/1
100 CAPSULE ORAL
Qty: 21 CAP | Refills: 0 | Status: SHIPPED | OUTPATIENT
Start: 2019-05-14 | End: 2019-05-21

## 2019-05-14 NOTE — PROGRESS NOTES
[facility] and spoke with Susanne Desai, because patient wants medication sent to local pharmacy.

## 2019-05-14 NOTE — TELEPHONE ENCOUNTER
Requested Prescriptions     Pending Prescriptions Disp Refills    benzonatate (TESSALON) 100 mg capsule 21 Cap 0     Sig: Take 1 Cap by mouth three (3) times daily as needed for Cough for up to 7 days.

## 2019-06-04 ENCOUNTER — OFFICE VISIT (OUTPATIENT)
Dept: FAMILY MEDICINE CLINIC | Age: 74
End: 2019-06-04

## 2019-06-04 VITALS
BODY MASS INDEX: 28.48 KG/M2 | OXYGEN SATURATION: 99 % | SYSTOLIC BLOOD PRESSURE: 110 MMHG | HEIGHT: 57 IN | RESPIRATION RATE: 16 BRPM | TEMPERATURE: 96.3 F | WEIGHT: 132 LBS | HEART RATE: 75 BPM | DIASTOLIC BLOOD PRESSURE: 62 MMHG

## 2019-06-04 DIAGNOSIS — I48.20 CHRONIC ATRIAL FIBRILLATION (HCC): ICD-10-CM

## 2019-06-04 DIAGNOSIS — E11.9 CONTROLLED TYPE 2 DIABETES MELLITUS WITHOUT COMPLICATION, WITHOUT LONG-TERM CURRENT USE OF INSULIN (HCC): Primary | ICD-10-CM

## 2019-06-04 NOTE — PROGRESS NOTES
Angie Acevedo is a 69 y.o. female (: 1945) presenting to address:    Chief Complaint   Patient presents with    Diabetes     follow up    Irregular Heart Beat     follow up       Vitals:    19 1231   BP: 110/62   Pulse: 75   Resp: 16   Temp: 96.3 °F (35.7 °C)   TempSrc: Temporal   SpO2: 99%   Weight: 132 lb (59.9 kg)   Height: 4' 9\" (1.448 m)   PainSc:   0 - No pain       Hearing/Vision:   No exam data present    Learning Assessment:     Learning Assessment 2018   PRIMARY LEARNER Patient   PRIMARY LANGUAGE ENGLISH   LEARNER PREFERENCE PRIMARY DEMONSTRATION     READING     LISTENING   ANSWERED BY Patient    RELATIONSHIP SELF     Depression Screening:     3 most recent PHQ Screens 2019   Little interest or pleasure in doing things Not at all   Feeling down, depressed, irritable, or hopeless Not at all   Total Score PHQ 2 0     Fall Risk Assessment:     Fall Risk Assessment, last 12 mths 2019   Able to walk? Yes   Fall in past 12 months? No     Abuse Screening:     Abuse Screening Questionnaire 2018   Do you ever feel afraid of your partner? -   Are you in a relationship with someone who physically or mentally threatens you? -   Is it safe for you to go home? Y     Coordination of Care Questionaire:   1. Have you been to the ER, urgent care clinic since your last visit? Hospitalized since your last visit? NO    2. Have you seen or consulted any other health care providers outside of the [address] [place] [address] since your last visit? Include any pap smears or colon screening.  NO

## 2019-06-04 NOTE — PROGRESS NOTES
HISTORY OF PRESENT ILLNESS  Angela Barajas is a 69 y.o. female here for follow-up on diabetes and atrial fibrillation. Patient is feeling well and has no complaints today. .  Diabetes   The history is provided by the patient. This is a chronic problem. The problem has been gradually improving. Pertinent negatives include no chest pain, no abdominal pain, no headaches and no shortness of breath. The symptoms are aggravated by eating. The symptoms are relieved by medications. Treatments tried: Metformin. The treatment provided significant relief. Irregular Heart Beat    The history is provided by the patient. This is a chronic problem. The problem has not changed since onset. The problem is associated with nothing. Associated symptoms include irregular heartbeat. Pertinent negatives include no diaphoresis, no malaise/fatigue, no chest pain, no chest pressure, no exertional chest pressure, no orthopnea, no syncope, no abdominal pain, no headaches, no dizziness and no shortness of breath. Risk factors include diabetes mellitus and postmenopause. She has tried prescription medication (Eliquis and atenolol) for the symptoms. The treatment provided significant relief. Her past medical history is significant for DM and atrial fibrillation. Allergies   Allergen Reactions    Alendronate Swelling     In the feet     Current Outpatient Medications on File Prior to Visit   Medication Sig Dispense Refill    apixaban (ELIQUIS) 5 mg tablet 5 mg.  ASCORBIC ACID, VITAMIN C, PO Take  by Mouth.  CHOLECALCIFEROL, VITAMIN D3, PO 1,000 mg.      CYANOCOBALAMIN, VITAMIN B-12, PO Take  by Mouth.  metFORMIN (GLUCOPHAGE) 1,000 mg tablet TAKE 1 TABLET TWICE DAILY      atenolol (TENORMIN) 100 mg tablet 100 mg.      vit a,c & e-lutein-minerals (OCUVITE) tablet daily.  GYMNEMA LEAF, BULK, by Does Not Apply route.  olive leaf extract 250 mg cap Take  by mouth.        No current facility-administered medications on file prior to visit. Past Medical History:   Diagnosis Date    [facility]     Diabetes ([facility]     Melanoma ([facility]     back of the head, removed    Osteoporosis     Periodic heart flutter      Past Surgical History:   Procedure Laterality Date    HX APPENDECTOMY  1966    HX OTHER SURGICAL  1963    Coccygectomy    HX TONSILLECTOMY  1965    HX TONSILLECTOMY      HX TUBAL LIGATION  1975     Family History   Problem Relation Age of Onset    Cancer Mother     Cancer Father      Social History     Socioeconomic History    Marital status: UNKNOWN     Spouse name: Not on file    Number of children: Not on file    Years of education: Not on file    Highest education level: Not on file   Occupational History    Not on file   Social Needs    Financial resource strain: Not on file    Food insecurity:     Worry: Not on file     Inability: Not on file    Transportation needs:     Medical: Not on file     Non-medical: Not on file   Tobacco Use    Smoking status: Never Smoker    Smokeless tobacco: Never Used   Substance and Sexual Activity    Alcohol use: No    Drug use: No    Sexual activity: Not Currently   Lifestyle    Physical activity:     Days per week: Not on file     Minutes per session: Not on file    Stress: Not on file   Relationships    Social connections:     Talks on phone: Not on file     Gets together: Not on file     Attends Jain service: Not on file     Active member of club or organization: Not on file     Attends meetings of clubs or organizations: Not on file     Relationship status: Not on file    Intimate partner violence:     Fear of current or ex partner: Not on file     Emotionally abused: Not on file     Physically abused: Not on file     Forced sexual activity: Not on file   Other Topics Concern    Not on file   Social History Narrative    Not on file       Review of Systems   Constitutional: Negative. Negative for diaphoresis and malaise/fatigue. Eyes: Negative. Respiratory: Negative. Negative for shortness of breath. Cardiovascular: Positive for palpitations. Negative for chest pain, orthopnea and syncope. Gastrointestinal: Negative for abdominal pain. Musculoskeletal: Negative. Neurological: Negative. Negative for dizziness and headaches. Endo/Heme/Allergies: Negative. Psychiatric/Behavioral: Negative. Visit Vitals  /62 (BP 1 Location: Right arm, BP Patient Position: Sitting)   Pulse 75   Temp 96.3 °F (35.7 °C) (Temporal)   Resp 16   Ht 4' 9\" (1.448 m)   Wt 132 lb (59.9 kg)   SpO2 99%   BMI 28.56 kg/m²       Physical Exam   Constitutional: She is oriented to person, place, and time. She appears well-developed and well-nourished. HENT:   Head: Normocephalic and atraumatic. Cardiovascular: Normal rate, regular rhythm, normal heart sounds and intact distal pulses. Exam reveals no gallop and no friction rub. No murmur heard. Pulmonary/Chest: Effort normal and breath sounds normal. No respiratory distress. She has no wheezes. She has no rales. Musculoskeletal: Normal range of motion. She exhibits no edema, tenderness or deformity. Neurological: She is alert and oriented to person, place, and time. No cranial nerve deficit. Coordination normal.   Skin: Skin is warm and dry. No rash noted. No erythema. No pallor. Psychiatric: She has a normal mood and affect. Her behavior is normal. Judgment and thought content normal.   Nursing note and vitals reviewed. ASSESSMENT and PLAN    ICD-10-CM ICD-9-CM    1. Controlled type 2 diabetes mellitus without complication, without long-term current use of insulin ([facility]) E11.9 250.00 HEMOGLOBIN A1C WITH EAG      CBC WITH AUTOMATED DIFF      METABOLIC PANEL, COMPREHENSIVE      LIPID PANEL      MICROALBUMIN, UR, RAND W/ MICROALB/CREAT RATIO      URINALYSIS W/ RFLX MICROSCOPIC     Follow-up and Dispositions    · Return in about 6 months (around 12/4/2019).

## 2019-06-05 LAB
ALBUMIN SERPL-MCNC: 4.5 G/DL (ref 3.5–4.8)
ALBUMIN/CREAT UR: 6.7 MG/G CREAT (ref 0–30)
ALBUMIN/GLOB SERPL: 2 {RATIO} (ref 1.2–2.2)
ALP SERPL-CCNC: 75 IU/L (ref 39–117)
ALT SERPL-CCNC: 12 IU/L (ref 0–32)
APPEARANCE UR: CLEAR
AST SERPL-CCNC: 16 IU/L (ref 0–40)
BASOPHILS # BLD AUTO: 0 X10E3/UL (ref 0–0.2)
BASOPHILS NFR BLD AUTO: 0 %
BILIRUB SERPL-MCNC: 0.8 MG/DL (ref 0–1.2)
BILIRUB UR QL STRIP: NEGATIVE
BUN SERPL-MCNC: 15 MG/DL (ref 8–27)
BUN/CREAT SERPL: 30 (ref 12–28)
CALCIUM SERPL-MCNC: 9.6 MG/DL (ref 8.7–10.3)
CHLORIDE SERPL-SCNC: 97 MMOL/L (ref 96–106)
CHOLEST SERPL-MCNC: 132 MG/DL (ref 100–199)
CO2 SERPL-SCNC: 23 MMOL/L (ref 20–29)
COLOR UR: YELLOW
CREAT SERPL-MCNC: 0.5 MG/DL (ref 0.57–1)
CREAT UR-MCNC: 142.5 MG/DL
EOSINOPHIL # BLD AUTO: 0.2 X10E3/UL (ref 0–0.4)
EOSINOPHIL NFR BLD AUTO: 2 %
ERYTHROCYTE [DISTWIDTH] IN BLOOD BY AUTOMATED COUNT: 14.5 % (ref 12.3–15.4)
EST. AVERAGE GLUCOSE BLD GHB EST-MCNC: 123 MG/DL
GLOBULIN SER CALC-MCNC: 2.3 G/DL (ref 1.5–4.5)
GLUCOSE SERPL-MCNC: 104 MG/DL (ref 65–99)
GLUCOSE UR QL: NEGATIVE
HBA1C MFR BLD: 5.9 % (ref 4.8–5.6)
HCT VFR BLD AUTO: 38 % (ref 34–46.6)
HDLC SERPL-MCNC: 69 MG/DL
HGB BLD-MCNC: 12.3 G/DL (ref 11.1–15.9)
HGB UR QL STRIP: NEGATIVE
IMM GRANULOCYTES # BLD AUTO: 0 X10E3/UL (ref 0–0.1)
IMM GRANULOCYTES NFR BLD AUTO: 0 %
INTERPRETATION, 910389: NORMAL
KETONES UR QL STRIP: NEGATIVE
LDLC SERPL CALC-MCNC: 50 MG/DL (ref 0–99)
LEUKOCYTE ESTERASE UR QL STRIP: NEGATIVE
LYMPHOCYTES # BLD AUTO: 3 X10E3/UL (ref 0.7–3.1)
LYMPHOCYTES NFR BLD AUTO: 37 %
MCH RBC QN AUTO: 28.5 PG (ref 26.6–33)
MCHC RBC AUTO-ENTMCNC: 32.4 G/DL (ref 31.5–35.7)
MCV RBC AUTO: 88 FL (ref 79–97)
MICRO URNS: NORMAL
MICROALBUMIN UR-MCNC: 9.5 UG/ML
MONOCYTES # BLD AUTO: 0.6 X10E3/UL (ref 0.1–0.9)
MONOCYTES NFR BLD AUTO: 7 %
NEUTROPHILS # BLD AUTO: 4.4 X10E3/UL (ref 1.4–7)
NEUTROPHILS NFR BLD AUTO: 54 %
NITRITE UR QL STRIP: NEGATIVE
PH UR STRIP: 6.5 [PH] (ref 5–7.5)
PLATELET # BLD AUTO: 402 X10E3/UL (ref 150–450)
POTASSIUM SERPL-SCNC: 4.5 MMOL/L (ref 3.5–5.2)
PROT SERPL-MCNC: 6.8 G/DL (ref 6–8.5)
PROT UR QL STRIP: NEGATIVE
RBC # BLD AUTO: 4.32 X10E6/UL (ref 3.77–5.28)
SODIUM SERPL-SCNC: 137 MMOL/L (ref 134–144)
SP GR UR: 1.02 (ref 1–1.03)
TRIGL SERPL-MCNC: 65 MG/DL (ref 0–149)
UROBILINOGEN UR STRIP-MCNC: 0.2 MG/DL (ref 0.2–1)
VLDLC SERPL CALC-MCNC: 13 MG/DL (ref 5–40)
WBC # BLD AUTO: 8.1 X10E3/UL (ref 3.4–10.8)

## 2019-06-14 ENCOUNTER — TELEPHONE (OUTPATIENT)
Dept: FAMILY MEDICINE CLINIC | Age: 74
End: 2019-06-14

## 2019-06-17 NOTE — TELEPHONE ENCOUNTER
A1c is 5.9 which is a little high but not even in the prediabetes range. She needs to drink a little more water. Otherwise labs are within normal limits.

## 2019-12-04 ENCOUNTER — OFFICE VISIT (OUTPATIENT)
Dept: FAMILY MEDICINE CLINIC | Age: 74
End: 2019-12-04

## 2019-12-04 VITALS
BODY MASS INDEX: 28.48 KG/M2 | HEIGHT: 57 IN | DIASTOLIC BLOOD PRESSURE: 56 MMHG | SYSTOLIC BLOOD PRESSURE: 102 MMHG | HEART RATE: 67 BPM | OXYGEN SATURATION: 97 % | RESPIRATION RATE: 15 BRPM | TEMPERATURE: 96.9 F | WEIGHT: 132 LBS

## 2019-12-04 DIAGNOSIS — I48.20 CHRONIC ATRIAL FIBRILLATION (HCC): Primary | ICD-10-CM

## 2019-12-04 DIAGNOSIS — E11.9 CONTROLLED TYPE 2 DIABETES MELLITUS WITHOUT COMPLICATION, WITHOUT LONG-TERM CURRENT USE OF INSULIN (HCC): ICD-10-CM

## 2019-12-04 NOTE — PROGRESS NOTES
Mason Costello is a 69 y.o. female (: 1945) presenting to address:    Chief Complaint   Patient presents with    Diabetes     follow up    Eye Problem     needs RX/ointment       Vitals:    19 0910   BP: 102/56   Pulse: 67   Resp: 15   Temp: 96.9 °F (36.1 °C)   TempSrc: Temporal   SpO2: 97%   Weight: 132 lb (59.9 kg)   Height: 4' 9\" (1.448 m)   PainSc:   0 - No pain       Hearing/Vision:   No exam data present    Learning Assessment:     Learning Assessment 2018   PRIMARY LEARNER Patient   PRIMARY LANGUAGE ENGLISH   LEARNER PREFERENCE PRIMARY DEMONSTRATION     READING     LISTENING   ANSWERED BY Patient    RELATIONSHIP SELF     Depression Screening:     3 most recent PHQ Screens 2019   Little interest or pleasure in doing things Not at all   Feeling down, depressed, irritable, or hopeless Not at all   Total Score PHQ 2 0     Fall Risk Assessment:     Fall Risk Assessment, last 12 mths 2019   Able to walk? Yes   Fall in past 12 months? No     Abuse Screening:     Abuse Screening Questionnaire 2018   Do you ever feel afraid of your partner? -   Are you in a relationship with someone who physically or mentally threatens you? -   Is it safe for you to go home? Y     Coordination of Care Questionaire:   1. Have you been to the ER, urgent care clinic since your last visit? Hospitalized since your last visit? NO    2. Have you seen or consulted any other health care providers outside of the [address] [place] [address] since your last visit? Include any pap smears or colon screening.  NO

## 2019-12-04 NOTE — PROGRESS NOTES
HISTORY OF PRESENT ILLNESS  Angela Tadeo is a 69 y.o. female follow-up on diabetes and atrial fibrillation. Patient is feeling well and has no complaints today. .  Diabetes   The history is provided by the patient. This is a chronic problem. The problem has been gradually improving. Pertinent negatives include no chest pain, no abdominal pain, no headaches and no shortness of breath. The symptoms are aggravated by eating. The symptoms are relieved by medications. Treatments tried: Metformin. The treatment provided significant relief. Irregular Heart Beat    The history is provided by the patient. This is a chronic problem. The problem has not changed since onset. The problem is associated with nothing. Associated symptoms include irregular heartbeat. Pertinent negatives include no diaphoresis, no malaise/fatigue, no chest pain, no chest pressure, no exertional chest pressure, no orthopnea, no syncope, no abdominal pain, no headaches, no dizziness and no shortness of breath. Risk factors include diabetes mellitus and postmenopause. She has tried prescription medication (Eliquis and atenolol) for the symptoms. The treatment provided significant relief. Her past medical history is significant for DM and atrial fibrillation. Her past medical history does not include heart disease or hypertension. Allergies   Allergen Reactions    Alendronate Swelling     In the feet     Current Outpatient Medications on File Prior to Visit   Medication Sig Dispense Refill    CHROMIUM PICOLINATE PO Take  by mouth.  apixaban (ELIQUIS) 5 mg tablet 5 mg.  ASCORBIC ACID, VITAMIN C, PO Take  by Mouth.  CHOLECALCIFEROL, VITAMIN D3, PO 1,000 mg.      CYANOCOBALAMIN, VITAMIN B-12, PO Take  by Mouth.  metFORMIN (GLUCOPHAGE) 1,000 mg tablet TAKE 1 TABLET TWICE DAILY      atenolol (TENORMIN) 100 mg tablet 100 mg.      vit a,c & e-lutein-minerals (OCUVITE) tablet daily.       [DISCONTINUED] GYMNEMA LEAF, BULK, by Does Not Apply route.  [DISCONTINUED] olive leaf extract 250 mg cap Take  by mouth. No current facility-administered medications on file prior to visit.       Past Medical History:   Diagnosis Date    A-fib ([facility]     Diabetes ([facility]     Melanoma ([facility]     back of the head, removed    Osteoporosis     Periodic heart flutter      Past Surgical History:   Procedure Laterality Date    HX APPENDECTOMY  1966    HX OTHER SURGICAL  1963    Coccygectomy    HX TONSILLECTOMY  1965    HX TONSILLECTOMY      HX TUBAL LIGATION  1975     Family History   Problem Relation Age of Onset    Cancer Mother     Cancer Father      Social History     Socioeconomic History    Marital status: UNKNOWN     Spouse name: Not on file    Number of children: Not on file    Years of education: Not on file    Highest education level: Not on file   Occupational History    Not on file   Social Needs    Financial resource strain: Not on file    Food insecurity:     Worry: Not on file     Inability: Not on file    Transportation needs:     Medical: Not on file     Non-medical: Not on file   Tobacco Use    Smoking status: Never Smoker    Smokeless tobacco: Never Used   Substance and Sexual Activity    Alcohol use: No    Drug use: No    Sexual activity: Not Currently   Lifestyle    Physical activity:     Days per week: Not on file     Minutes per session: Not on file    Stress: Not on file   Relationships    Social connections:     Talks on phone: Not on file     Gets together: Not on file     Attends Roman Catholic service: Not on file     Active member of club or organization: Not on file     Attends meetings of clubs or organizations: Not on file     Relationship status: Not on file    Intimate partner violence:     Fear of current or ex partner: Not on file     Emotionally abused: Not on file     Physically abused: Not on file     Forced sexual activity: Not on file   Other Topics Concern    Not on file   Social History Narrative    Not on file       Review of Systems   Constitutional: Negative. Negative for diaphoresis and malaise/fatigue. Eyes: Negative. Respiratory: Negative. Negative for shortness of breath. Cardiovascular: Positive for palpitations. Negative for chest pain, orthopnea and syncope. Gastrointestinal: Negative for abdominal pain. Musculoskeletal: Negative. Neurological: Negative. Negative for dizziness and headaches. Endo/Heme/Allergies: Negative. Psychiatric/Behavioral: Negative. Visit Vitals  /56 (BP 1 Location: Left arm, BP Patient Position: Sitting)   Pulse 67   Temp 96.9 °F (36.1 °C) (Temporal)   Resp 15   Ht 4' 9\" (1.448 m)   Wt 132 lb (59.9 kg)   SpO2 97%   BMI 28.56 kg/m²       Physical Exam  Vitals signs and nursing note reviewed. Constitutional:       Appearance: She is well-developed. HENT:      Head: Normocephalic and atraumatic. Cardiovascular:      Rate and Rhythm: Normal rate. Rhythm irregular. Heart sounds: Normal heart sounds. No murmur. No friction rub. No gallop. Pulmonary:      Effort: Pulmonary effort is normal. No respiratory distress. Breath sounds: Normal breath sounds. No wheezing or rales. Musculoskeletal: Normal range of motion. General: No tenderness or deformity. Skin:     General: Skin is warm and dry. Coloration: Skin is not pale. Findings: No erythema or rash. Neurological:      Mental Status: She is alert and oriented to person, place, and time. Cranial Nerves: No cranial nerve deficit. Coordination: Coordination normal.   Psychiatric:         Behavior: Behavior normal.         Thought Content: Thought content normal.         Judgment: Judgment normal.         ASSESSMENT and PLAN    ICD-10-CM ICD-9-CM    1. Chronic atrial fibrillation I48.20 427.31 CBC WITH AUTOMATED DIFF   2.  Controlled type 2 diabetes mellitus without complication, without long-term current use of insulin ([facility]) E11.9 250.00 HEMOGLOBIN A1C WITH EAG      CBC WITH AUTOMATED DIFF      METABOLIC PANEL, COMPREHENSIVE      LIPID PANEL      MICROALBUMIN, UR, RAND W/ MICROALB/CREAT RATIO      URINALYSIS W/ RFLX MICROSCOPIC     Follow-up and Dispositions    · Return in about 6 months (around 6/4/2020).

## 2019-12-06 LAB
ALBUMIN SERPL-MCNC: 4.8 G/DL (ref 3.5–4.8)
ALBUMIN/CREAT UR: 7.1 MG/G CREAT (ref 0–30)
ALBUMIN/GLOB SERPL: 2.4 {RATIO} (ref 1.2–2.2)
ALP SERPL-CCNC: 73 IU/L (ref 39–117)
ALT SERPL-CCNC: 13 IU/L (ref 0–32)
APPEARANCE UR: CLEAR
AST SERPL-CCNC: 14 IU/L (ref 0–40)
BACTERIA #/AREA URNS HPF: NORMAL /[HPF]
BASOPHILS # BLD AUTO: 0 X10E3/UL (ref 0–0.2)
BASOPHILS NFR BLD AUTO: 1 %
BILIRUB SERPL-MCNC: 0.8 MG/DL (ref 0–1.2)
BILIRUB UR QL STRIP: NEGATIVE
BUN SERPL-MCNC: 13 MG/DL (ref 8–27)
BUN/CREAT SERPL: 23 (ref 12–28)
CALCIUM SERPL-MCNC: 9.5 MG/DL (ref 8.7–10.3)
CASTS URNS QL MICRO: NORMAL /LPF
CHLORIDE SERPL-SCNC: 98 MMOL/L (ref 96–106)
CHOLEST SERPL-MCNC: 155 MG/DL (ref 100–199)
CO2 SERPL-SCNC: 22 MMOL/L (ref 20–29)
COLOR UR: YELLOW
CREAT SERPL-MCNC: 0.56 MG/DL (ref 0.57–1)
CREAT UR-MCNC: 52.3 MG/DL
EOSINOPHIL # BLD AUTO: 0.1 X10E3/UL (ref 0–0.4)
EOSINOPHIL NFR BLD AUTO: 1 %
EPI CELLS #/AREA URNS HPF: NORMAL /HPF (ref 0–10)
ERYTHROCYTE [DISTWIDTH] IN BLOOD BY AUTOMATED COUNT: 13.2 % (ref 12.3–15.4)
EST. AVERAGE GLUCOSE BLD GHB EST-MCNC: 123 MG/DL
GLOBULIN SER CALC-MCNC: 2 G/DL (ref 1.5–4.5)
GLUCOSE SERPL-MCNC: 101 MG/DL (ref 65–99)
GLUCOSE UR QL: NEGATIVE
HBA1C MFR BLD: 5.9 % (ref 4.8–5.6)
HCT VFR BLD AUTO: 38.3 % (ref 34–46.6)
HDLC SERPL-MCNC: 79 MG/DL
HGB BLD-MCNC: 12.6 G/DL (ref 11.1–15.9)
HGB UR QL STRIP: NEGATIVE
IMM GRANULOCYTES # BLD AUTO: 0 X10E3/UL (ref 0–0.1)
IMM GRANULOCYTES NFR BLD AUTO: 1 %
INTERPRETATION, 910389: NORMAL
KETONES UR QL STRIP: NEGATIVE
LDLC SERPL CALC-MCNC: 61 MG/DL (ref 0–99)
LEUKOCYTE ESTERASE UR QL STRIP: ABNORMAL
LYMPHOCYTES # BLD AUTO: 3 X10E3/UL (ref 0.7–3.1)
LYMPHOCYTES NFR BLD AUTO: 34 %
MCH RBC QN AUTO: 28.9 PG (ref 26.6–33)
MCHC RBC AUTO-ENTMCNC: 32.9 G/DL (ref 31.5–35.7)
MCV RBC AUTO: 88 FL (ref 79–97)
MICRO URNS: ABNORMAL
MICROALBUMIN UR-MCNC: 3.7 UG/ML
MONOCYTES # BLD AUTO: 0.7 X10E3/UL (ref 0.1–0.9)
MONOCYTES NFR BLD AUTO: 8 %
MUCOUS THREADS URNS QL MICRO: PRESENT
NEUTROPHILS # BLD AUTO: 4.9 X10E3/UL (ref 1.4–7)
NEUTROPHILS NFR BLD AUTO: 55 %
NITRITE UR QL STRIP: NEGATIVE
PH UR STRIP: 6.5 [PH] (ref 5–7.5)
PLATELET # BLD AUTO: 399 X10E3/UL (ref 150–450)
POTASSIUM SERPL-SCNC: 4.3 MMOL/L (ref 3.5–5.2)
PROT SERPL-MCNC: 6.8 G/DL (ref 6–8.5)
PROT UR QL STRIP: NEGATIVE
RBC # BLD AUTO: 4.36 X10E6/UL (ref 3.77–5.28)
RBC #/AREA URNS HPF: NORMAL /HPF (ref 0–2)
SODIUM SERPL-SCNC: 138 MMOL/L (ref 134–144)
SP GR UR: 1.01 (ref 1–1.03)
TRIGL SERPL-MCNC: 77 MG/DL (ref 0–149)
UROBILINOGEN UR STRIP-MCNC: 0.2 MG/DL (ref 0.2–1)
VLDLC SERPL CALC-MCNC: 15 MG/DL (ref 5–40)
WBC # BLD AUTO: 8.7 X10E3/UL (ref 3.4–10.8)
WBC #/AREA URNS HPF: NORMAL /HPF (ref 0–5)

## 2019-12-13 ENCOUNTER — TELEPHONE (OUTPATIENT)
Dept: FAMILY MEDICINE CLINIC | Age: 74
End: 2019-12-13

## 2019-12-15 NOTE — TELEPHONE ENCOUNTER
A1c is 5.9 which is just slightly above normal.  All other labs are essentially within normal limits

## 2020-08-18 ENCOUNTER — TELEPHONE (OUTPATIENT)
Dept: PHARMACY | Age: 75
End: 2020-08-18

## 2020-09-10 ENCOUNTER — VIRTUAL VISIT (OUTPATIENT)
Dept: FAMILY MEDICINE CLINIC | Age: 75
End: 2020-09-10

## 2020-09-10 DIAGNOSIS — Z00.00 ROUTINE GENERAL MEDICAL EXAMINATION AT A HEALTH CARE FACILITY: ICD-10-CM

## 2020-09-10 DIAGNOSIS — E11.9 CONTROLLED TYPE 2 DIABETES MELLITUS WITHOUT COMPLICATION, WITHOUT LONG-TERM CURRENT USE OF INSULIN (HCC): ICD-10-CM

## 2020-09-10 DIAGNOSIS — I48.20 CHRONIC ATRIAL FIBRILLATION (HCC): Primary | ICD-10-CM

## 2020-09-10 NOTE — PATIENT INSTRUCTIONS
Medicare Wellness Visit, Female The best way to live healthy is to have a lifestyle where you eat a well-balanced diet, exercise regularly, limit alcohol use, and quit all forms of tobacco/nicotine, if applicable. Regular preventive services are another way to keep healthy. Preventive services (vaccines, screening tests, monitoring & exams) can help personalize your care plan, which helps you manage your own care. Screening tests can find health problems at the earliest stages, when they are easiest to treat. [facility] follows the current, evidence-based guidelines published by the [facility] (RUST[employer]) when recommending preventive services for our patients. Because we follow these guidelines, sometimes recommendations change over time as research supports it. (For example, mammograms used to be recommended annually. Even though Medicare will still pay for an annual mammogram, the newer guidelines recommend a mammogram every two years for women of average risk). Of course, you and your doctor may decide to screen more often for some diseases, based on your risk and your co-morbidities (chronic disease you are already diagnosed with). Preventive services for you include: - Medicare offers their members a free annual wellness visit, which is time for you and your primary care provider to discuss and plan for your preventive service needs. Take advantage of this benefit every year! 
-All adults over the age of 72 should receive the recommended pneumonia vaccines. Current USPSTF guidelines recommend a series of two vaccines for the best pneumonia protection.  
-All adults should have a flu vaccine yearly and a tetanus vaccine every 10 years.  
-All adults age 48 and older should receive the shingles vaccines (series of two vaccines). -All adults age 38-68 who are overweight should have a diabetes screening test once every three years. -All adults born between [birth year] and 1965 should be screened once for Hepatitis C. 
-Other screening tests and preventive services for persons with diabetes include: an eye exam to screen for diabetic retinopathy, a kidney function test, a foot exam, and stricter control over your cholesterol.  
-Cardiovascular screening for adults with routine risk involves an electrocardiogram (ECG) at intervals determined by your doctor.  
-Colorectal cancer screenings should be done for adults age 54-65 with no increased risk factors for colorectal cancer. There are a number of acceptable methods of screening for this type of cancer. Each test has its own benefits and drawbacks. Discuss with your doctor what is most appropriate for you during your annual wellness visit. The different tests include: colonoscopy (considered the best screening method), a fecal occult blood test, a fecal DNA test, and sigmoidoscopy. 
 
-A bone mass density test is recommended when a woman turns 65 to screen for osteoporosis. This test is only recommended one time, as a screening. Some providers will use this same test as a disease monitoring tool if you already have osteoporosis. -Breast cancer screenings are recommended every other year for women of normal risk, age 54-69. 
-Cervical cancer screenings for women over age 72 are only recommended with certain risk factors. Here is a list of your current Health Maintenance items (your personalized list of preventive services) with a due date: 
Health Maintenance Due Topic Date Due  
 Hepatitis C Test  1945  Diabetic Foot Care  02/14/1956  DTaP/Tdap/Td  (1 - Tdap) 02/14/1967  Shingles Vaccine (1 of 2) 02/14/1996  Colon Cancer Stool Test  02/14/1996  Pneumococcal Vaccine (1 of 1 - PPSV23) 02/14/2011 [facility] Annual Well Visit  03/20/2018  Yearly Flu Vaccine (1) 09/01/2020

## 2020-09-10 NOTE — PROGRESS NOTES
1st attempt-8:16am- No answer. Left message to return call for check in prior to virtual appointment. 1. Have you been to the ER, urgent care clinic since your last visit? Hospitalized since your last visit? No    2. Have you seen or consulted any other health care providers outside of the [address] [place] [address] since your last visit? Include any pap smears or colon screening.  No

## 2020-09-10 NOTE — PROGRESS NOTES
HISTORY OF PRESENT ILLNESS  Angela Kovacs is a 77 y.o. female who presents today for follow-up on diabetes and atrial fibrillation. Patient admits to not checking her blood sugar levels. Overall she is doing well and has no complaints today. Consent:  he and/or  healthcare decision maker is aware that this patient-initiated Telehealth encounter is a billable service, with coverage as determined by her insurance carrier. he is aware that she may receive a bill and has provided verbal consent to proceed: Yes    I was at home while conducting this encounter. .  Diabetes   The history is provided by the patient. This is a chronic problem. The problem has been gradually improving. Pertinent negatives include no chest pain, no abdominal pain, no headaches and no shortness of breath. The symptoms are aggravated by eating. The symptoms are relieved by medications. Treatments tried: Metformin. The treatment provided significant relief. Palpitations    The history is provided by the patient. This is a chronic problem. The problem has not changed since onset. The problem is associated with nothing. Associated symptoms include irregular heartbeat. Pertinent negatives include no diaphoresis, no malaise/fatigue, no chest pain, no chest pressure, no exertional chest pressure, no near-syncope, no orthopnea, no syncope, no abdominal pain, no headaches, no dizziness, no weakness and no shortness of breath. Risk factors include diabetes mellitus and postmenopause. She has tried prescription medication (Eliquis and atenolol) for the symptoms. The treatment provided significant relief. Her past medical history is significant for DM and atrial fibrillation. Her past medical history does not include heart disease or hypertension.      Allergies   Allergen Reactions    Alendronate Swelling     In the feet     Current Outpatient Medications on File Prior to Visit   Medication Sig Dispense Refill    CHROMIUM PICOLINATE PO Take 1 Tab by mouth daily.  apixaban (ELIQUIS) 5 mg tablet Take 5 mg by mouth two (2) times a day.  ASCORBIC ACID, VITAMIN C, PO Take 1 Tab by mouth daily.  CHOLECALCIFEROL, VITAMIN D3, PO Take 1,000 Units by mouth daily.  CYANOCOBALAMIN, VITAMIN B-12, PO Take 1 Tab by mouth daily.  metFORMIN (GLUCOPHAGE) 1,000 mg tablet TAKE 1 TABLET TWICE DAILY      atenolol (TENORMIN) 100 mg tablet Take 50 mg by mouth daily.  vit a,c & e-lutein-minerals (OCUVITE) tablet Take 1 Tab by mouth daily. No current facility-administered medications on file prior to visit.       Past Medical History:   Diagnosis Date    A[facility]fib ([facility]     Diabetes ([facility]     Melanoma ([facility]     back of the head, removed    Osteoporosis     Periodic heart flutter      Past Surgical History:   Procedure Laterality Date    HX APPENDECTOMY  1966    HX OTHER SURGICAL  1963    Coccygectomy    HX TONSILLECTOMY  1965    HX TONSILLECTOMY      HX TUBAL LIGATION  1975     Family History   Problem Relation Age of Onset    Cancer Mother     Cancer Father      Social History     Socioeconomic History    Marital status: UNKNOWN     Spouse name: Not on file    Number of children: Not on file    Years of education: Not on file    Highest education level: Not on file   Occupational History    Not on file   Social Needs    Financial resource strain: Not on file    Food insecurity     Worry: Not on file     Inability: Not on file    Transportation needs     Medical: Not on file     Non-medical: Not on file   Tobacco Use    Smoking status: Never Smoker    Smokeless tobacco: Never Used   Substance and Sexual Activity    Alcohol use: No    Drug use: No    Sexual activity: Not Currently   Lifestyle    Physical activity     Days per week: Not on file     Minutes per session: Not on file    Stress: Not on file   Relationships    Social connections     Talks on phone: Not on file     Gets together: Not on file     Attends Confucianism service: Not on file     Active member of club or organization: Not on file     Attends meetings of clubs or organizations: Not on file     Relationship status: Not on file    Intimate partner violence     Fear of current or ex partner: Not on file     Emotionally abused: Not on file     Physically abused: Not on file     Forced sexual activity: Not on file   Other Topics Concern    Not on file   Social History Narrative    Not on file         Review of Systems   Constitutional: Negative. Negative for diaphoresis and malaise/fatigue. Eyes: Negative. Respiratory: Negative. Negative for shortness of breath. Cardiovascular: Positive for palpitations. Negative for chest pain, orthopnea, syncope and near-syncope. Gastrointestinal: Negative for abdominal pain. Musculoskeletal: Negative. Neurological: Negative. Negative for dizziness, weakness and headaches. Endo/Heme/Allergies: Negative. Psychiatric/Behavioral: Negative. There were no vitals taken for this visit. Physical Exam  Nursing note reviewed. Constitutional:       Appearance: She is well-developed. HENT:      Head: Normocephalic and atraumatic. Pulmonary:      Effort: Pulmonary effort is normal. No respiratory distress. Musculoskeletal: Normal range of motion. General: No tenderness or deformity. Skin:     General: Skin is dry. Coloration: Skin is not pale. Findings: No erythema or rash. Neurological:      Mental Status: She is alert and oriented to person, place, and time. Cranial Nerves: No cranial nerve deficit. Coordination: Coordination normal.   Psychiatric:         Behavior: Behavior normal.         Thought Content: Thought content normal.         Judgment: Judgment normal.         ASSESSMENT and PLAN    ICD-10-CM ICD-9-CM    1. Chronic atrial fibrillation (HCC)  I48.20 427.31    2.  Controlled type 2 diabetes mellitus without complication, without long-term current use of insulin ([facility]  E11.9 250.00    We discussed the expected course, resolution and complications of the diagnosis(es) in detail. Medication risks, benefits, costs, interactions, and alternatives were discussed as indicated. I advised her to contact the office if her condition worsens, changes or fails to improve as anticipated. She expressed understanding with the diagnosis(es) and plan. Pursuant to the emergency declaration under the [address] [place] [address] waiver authority and the [employer] Act, this Virtual  Visit was conducted, with patient's consent, to reduce the patient's risk of exposure to COVID-19 and provide continuity of care for an established patient. Services were provided through an audio synchronous discussion virtually to substitute for in-person clinic visit. Time spent 30 minutes  Reyna Reardon MD      Follow-up and Dispositions    · Return in about 6 months (around 3/10/2021). This is an Initial Medicare Annual Wellness Exam (AWV) (Performed 12 months after IPPE or effective date of Medicare Part B enrollment, Once in a lifetime)    I have reviewed the patient's medical history in detail and updated the computerized patient record.      History     Patient Active Problem List   Diagnosis Code    Osteoporosis M81.0    Chronic atrial fibrillation ([address] I48.20    Controlled type 2 diabetes mellitus without complication, without long-term current use of insulin ([address] E11.9     Past Medical History:   Diagnosis Date    A-fib ([address]     Diabetes ([address]     Melanoma ([address]     back of the head, removed    Osteoporosis     Periodic heart flutter       Past Surgical History:   Procedure Laterality Date    HX APPENDECTOMY  1966    HX OTHER SURGICAL  1963    Coccygectomy    HX TONSILLECTOMY  1965    HX TONSILLECTOMY      HX TUBAL LIGATION  1975     Current Outpatient Medications   Medication Sig Dispense Refill    CHROMIUM PICOLINATE PO Take 1 Tab by mouth daily.  apixaban (ELIQUIS) 5 mg tablet Take 5 mg by mouth two (2) times a day.  ASCORBIC ACID, VITAMIN C, PO Take 1 Tab by mouth daily.  CHOLECALCIFEROL, VITAMIN D3, PO Take 1,000 Units by mouth daily.  CYANOCOBALAMIN, VITAMIN B-12, PO Take 1 Tab by mouth daily.  metFORMIN (GLUCOPHAGE) 1,000 mg tablet TAKE 1 TABLET TWICE DAILY      atenolol (TENORMIN) 100 mg tablet Take 50 mg by mouth daily.  vit a,c & e-lutein-minerals (OCUVITE) tablet Take 1 Tab by mouth daily. Allergies   Allergen Reactions    Alendronate Swelling     In the feet       Family History   Problem Relation Age of Onset    Cancer Mother     Cancer Father      Social History     Tobacco Use    Smoking status: Never Smoker    Smokeless tobacco: Never Used   Substance Use Topics    Alcohol use: No       Depression Risk Factor Screening:     3 most recent PHQ Screens 9/10/2020   Little interest or pleasure in doing things Not at all   Feeling down, depressed, irritable, or hopeless Not at all   Total Score PHQ 2 0       Alcohol Risk Screen   Do you average more than 1 drink per night or more than 7 drinks a week:  No    On any one occasion in the past three months have you have had more than 3 drinks containing alcohol:  No        Functional Ability and Level of Safety:   Hearing: Hearing is good. Activities of Daily Living: The home contains: no safety equipment. Patient does total self care     Ambulation: with no difficulty      Fall Risk:  Fall Risk Assessment, last 12 mths 9/10/2020   Able to walk? Yes   Fall in past 12 months? Yes   Fall with injury?  Yes   Number of falls in past 12 months 1   Fall Risk Score 2     Abuse Screen:  Patient is not abused       Cognitive Screening   Has your family/caregiver stated any concerns about your memory: no     Cognitive Screening: Normal - Verbal Fluency Test    Patient Care Team   Patient Care Team:  Juliana Chaudhari Nuzhat Montero MD as PCP - General (Internal Medicine)  Baldomero Pan MD as PCP - [facility] Provider    Assessment/Plan   Education and counseling provided:  Are appropriate based on today's review and evaluation  Influenza Vaccine  Screening Mammography  Colorectal cancer screening tests  Bone mass measurement (DEXA)    Diagnoses and all orders for this visit:    1. Chronic atrial fibrillation (HCC)  -     METABOLIC PANEL, COMPREHENSIVE; Future    2. Controlled type 2 diabetes mellitus without complication, without long-term current use of insulin (HCC)  -     HEMOGLOBIN A1C WITH EAG; Future  -     CBC WITH AUTOMATED DIFF; Future  -     METABOLIC PANEL, COMPREHENSIVE; Future  -     LIPID PANEL; Future  -     MICROALBUMIN, UR, RAND W/ MICROALB/CREAT RATIO; Future  -     URINALYSIS W/ RFLX MICROSCOPIC; Future    3. Routine general medical examination at a health care facility    Patient refuses colorectal cancer screening. Health Maintenance Due   Topic Date Due    Hepatitis C Screening  1945    Foot Exam Q1  02/14/1956    DTaP/Tdap/Td series (1 - Tdap) 02/14/1967    Shingrix Vaccine Age 50> (1 of 2) 02/14/1996    FOBT Q1Y Age 50-75  02/14/1996    Pneumococcal 65+ years (1 of 1 - PPSV23) 02/14/2011    Medicare Yearly Exam  03/20/2018    Flu Vaccine (1) 09/01/2020         Angela Merino, who was evaluated through a synchronous (real-time) audio only encounter, and/or her healthcare decision maker, is aware that it is a billable service, with coverage as determined by her insurance carrier. She provided verbal consent to proceed: Yes, and patient identification was verified. It was conducted pursuant to the emergency declaration under the [facility], [address] [place] [address] waiver authority and the [employer] and [employer] Act. A caregiver was present when appropriate. Ability to conduct physical exam was limited. I was at home.  The patient was at home.       Marina Lujan MD

## 2020-10-21 ENCOUNTER — APPOINTMENT (OUTPATIENT)
Dept: FAMILY MEDICINE CLINIC | Age: 75
End: 2020-10-21

## 2020-10-22 LAB
ALBUMIN SERPL-MCNC: 4.6 G/DL (ref 3.7–4.7)
ALBUMIN/GLOB SERPL: 2.2 {RATIO} (ref 1.2–2.2)
ALP SERPL-CCNC: 81 IU/L (ref 39–117)
ALT SERPL-CCNC: 13 IU/L (ref 0–32)
AST SERPL-CCNC: 13 IU/L (ref 0–40)
BASOPHILS # BLD AUTO: 0.1 X10E3/UL (ref 0–0.2)
BASOPHILS NFR BLD AUTO: 1 %
BILIRUB SERPL-MCNC: 0.5 MG/DL (ref 0–1.2)
BUN SERPL-MCNC: 12 MG/DL (ref 8–27)
BUN/CREAT SERPL: 19 (ref 12–28)
CALCIUM SERPL-MCNC: 9.8 MG/DL (ref 8.7–10.3)
CHLORIDE SERPL-SCNC: 103 MMOL/L (ref 96–106)
CHOLEST SERPL-MCNC: 127 MG/DL (ref 100–199)
CO2 SERPL-SCNC: 25 MMOL/L (ref 20–29)
CREAT SERPL-MCNC: 0.63 MG/DL (ref 0.57–1)
CREAT UR-MCNC: NORMAL MG/DL
EOSINOPHIL # BLD AUTO: 0.2 X10E3/UL (ref 0–0.4)
EOSINOPHIL NFR BLD AUTO: 2 %
ERYTHROCYTE [DISTWIDTH] IN BLOOD BY AUTOMATED COUNT: 13 % (ref 11.7–15.4)
EST. AVERAGE GLUCOSE BLD GHB EST-MCNC: 123 MG/DL
GLOBULIN SER CALC-MCNC: 2.1 G/DL (ref 1.5–4.5)
GLUCOSE SERPL-MCNC: 106 MG/DL (ref 65–99)
GLUCOSE UR QL: NORMAL
HBA1C MFR BLD: 5.9 % (ref 4.8–5.6)
HCT VFR BLD AUTO: 38.7 % (ref 34–46.6)
HDLC SERPL-MCNC: 73 MG/DL
HGB BLD-MCNC: 12.6 G/DL (ref 11.1–15.9)
IMM GRANULOCYTES # BLD AUTO: 0.1 X10E3/UL (ref 0–0.1)
IMM GRANULOCYTES NFR BLD AUTO: 1 %
INTERPRETATION, 910389: NORMAL
KETONES UR QL STRIP: NORMAL
LDLC SERPL CALC-MCNC: 40 MG/DL (ref 0–99)
LYMPHOCYTES # BLD AUTO: 4.2 X10E3/UL (ref 0.7–3.1)
LYMPHOCYTES NFR BLD AUTO: 42 %
Lab: NORMAL
MCH RBC QN AUTO: 28.9 PG (ref 26.6–33)
MCHC RBC AUTO-ENTMCNC: 32.6 G/DL (ref 31.5–35.7)
MCV RBC AUTO: 89 FL (ref 79–97)
MICROALBUMIN UR-MCNC: NORMAL
MONOCYTES # BLD AUTO: 0.8 X10E3/UL (ref 0.1–0.9)
MONOCYTES NFR BLD AUTO: 8 %
NEUTROPHILS # BLD AUTO: 4.8 X10E3/UL (ref 1.4–7)
NEUTROPHILS NFR BLD AUTO: 46 %
PH UR STRIP: NORMAL [PH]
PLATELET # BLD AUTO: 392 X10E3/UL (ref 150–450)
POTASSIUM SERPL-SCNC: 4.5 MMOL/L (ref 3.5–5.2)
PROT SERPL-MCNC: 6.7 G/DL (ref 6–8.5)
PROT UR QL STRIP: NORMAL
RBC # BLD AUTO: 4.36 X10E6/UL (ref 3.77–5.28)
SODIUM SERPL-SCNC: 142 MMOL/L (ref 134–144)
SP GR UR: NORMAL
TRIGL SERPL-MCNC: 65 MG/DL (ref 0–149)
VLDLC SERPL CALC-MCNC: 14 MG/DL (ref 5–40)
WBC # BLD AUTO: 10.1 X10E3/UL (ref 3.4–10.8)

## 2022-03-18 PROBLEM — E11.9 CONTROLLED TYPE 2 DIABETES MELLITUS WITHOUT COMPLICATION, WITHOUT LONG-TERM CURRENT USE OF INSULIN (HCC): Status: ACTIVE | Noted: 2018-07-25

## 2022-03-19 PROBLEM — I48.20 CHRONIC ATRIAL FIBRILLATION (HCC): Status: ACTIVE | Noted: 2018-07-25
